# Patient Record
Sex: FEMALE | Race: WHITE | NOT HISPANIC OR LATINO | Employment: UNEMPLOYED | URBAN - METROPOLITAN AREA
[De-identification: names, ages, dates, MRNs, and addresses within clinical notes are randomized per-mention and may not be internally consistent; named-entity substitution may affect disease eponyms.]

---

## 2023-10-23 ENCOUNTER — HOSPITAL ENCOUNTER (OUTPATIENT)
Facility: HOSPITAL | Age: 23
Discharge: HOME | End: 2023-10-23
Attending: PEDIATRICS | Admitting: PEDIATRICS
Payer: MEDICAID

## 2023-10-23 VITALS
TEMPERATURE: 98.3 F | OXYGEN SATURATION: 99 % | SYSTOLIC BLOOD PRESSURE: 115 MMHG | HEART RATE: 72 BPM | WEIGHT: 100 LBS | BODY MASS INDEX: 18.88 KG/M2 | RESPIRATION RATE: 18 BRPM | HEIGHT: 61 IN | DIASTOLIC BLOOD PRESSURE: 52 MMHG

## 2023-10-23 DIAGNOSIS — J02.0 STREP PHARYNGITIS: Primary | ICD-10-CM

## 2023-10-23 LAB — STREP A PCR: DETECTED

## 2023-10-23 PROCEDURE — 87651 STREP A DNA AMP PROBE: CPT | Performed by: PEDIATRICS

## 2023-10-23 PROCEDURE — G0463 HOSPITAL OUTPT CLINIC VISIT: HCPCS

## 2023-10-23 RX ORDER — AMOXICILLIN 500 MG/1
1000 CAPSULE ORAL 2 TIMES DAILY
Qty: 20 CAPSULE | Refills: 0 | Status: SHIPPED | OUTPATIENT
Start: 2023-10-23 | End: 2023-11-02

## 2023-10-23 NOTE — DISCHARGE INSTRUCTIONS
Take Tylenol and Motrin for pain every 4-6 hours    Increase your fluid intake    Take amoxicillin as directed    Follow-up with a family doctor as needed return to ER for worsening symptoms

## 2023-10-23 NOTE — FSED PROVIDER NOTE
Subjective   History of Present Illness  23-year-old female presents here today with her 2 children reporting that on others child tested positive for strep on Saturday.  The patient is reporting a sore throat for 2 to 3 days reports that she is able to drink liquids without any distress.  Denies chest pain shortness of breath nausea vomiting diarrhea diarrhea does report having fever at home but unsure how high the fever was.        Review of Systems   All other systems reviewed and are negative.      History reviewed. No pertinent past medical history.    No Known Allergies    History reviewed. No pertinent surgical history.    History reviewed. No pertinent family history.    Social History     Socioeconomic History    Marital status:            Objective   Physical Exam  Constitutional:       Appearance: She is well-developed and normal weight.   HENT:      Head: Normocephalic and atraumatic.      Right Ear: Tympanic membrane normal.      Left Ear: Tympanic membrane normal.      Mouth/Throat:      Mouth: Mucous membranes are moist.      Pharynx: Uvula midline. Posterior oropharyngeal erythema (Bilateral tonsil redness oropharynx red) present.      Tonsils: Tonsillar exudate (Bilateral white tonsillar exudate) present. 1+ on the right. 1+ on the left.   Eyes:      Conjunctiva/sclera: Conjunctivae normal.   Cardiovascular:      Rate and Rhythm: Normal rate and regular rhythm.   Pulmonary:      Effort: Pulmonary effort is normal.   Abdominal:      General: Bowel sounds are normal.   Musculoskeletal:      Cervical back: Normal range of motion and neck supple.   Skin:     General: Skin is warm and dry.   Neurological:      General: No focal deficit present.      Mental Status: She is alert.         Procedures           ED Course  ED Course as of 10/23/23 1337   Mon Oct 23, 2023   1230 Patient is positive for strep [WF]      ED Course User Index  [WF] Warren Neely Jr., APRN                                            Medical Decision Making  Patient's positive for strep pharyngitis we will treat with amoxicillin no signs of difficulty swallowing or respiratory distress    Problems Addressed:  Strep pharyngitis: complicated acute illness or injury    Risk  Prescription drug management.        Final diagnoses:   Strep pharyngitis       ED Disposition  ED Disposition       ED Disposition   Discharge    Condition   Stable    Comment   --               PATIENT CONNECTION - MARTHA  [facility] [id number]  [phone]  Schedule an appointment as soon as possible for a visit   Or follow-up with your primary care provider         Medication List        New Prescriptions      amoxicillin 500 MG capsule  Commonly known as: AMOXIL  Take 2 capsules by mouth 2 (Two) Times a Day for 10 days.               Where to Get Your Medications        These medications were sent to [facility] [id number] - [place], [place] - [address] [place] [address] [address]  - [phone]  - [phone] [address] [place] [address]      Phone: [phone]   amoxicillin 500 MG capsule

## 2024-01-25 ENCOUNTER — HOSPITAL ENCOUNTER (OUTPATIENT)
Facility: HOSPITAL | Age: 24
Discharge: HOME | End: 2024-01-25
Attending: EMERGENCY MEDICINE | Admitting: EMERGENCY MEDICINE
Payer: MEDICAID

## 2024-01-25 VITALS
OXYGEN SATURATION: 98 % | TEMPERATURE: 98 F | HEART RATE: 79 BPM | SYSTOLIC BLOOD PRESSURE: 106 MMHG | DIASTOLIC BLOOD PRESSURE: 59 MMHG | WEIGHT: 105.9 LBS | BODY MASS INDEX: 19.49 KG/M2 | HEIGHT: 62 IN | RESPIRATION RATE: 18 BRPM

## 2024-01-25 DIAGNOSIS — J02.0 ACUTE STREPTOCOCCAL PHARYNGITIS: Primary | ICD-10-CM

## 2024-01-25 LAB
FLUAV SUBTYP SPEC NAA+PROBE: NOT DETECTED
FLUBV RNA ISLT QL NAA+PROBE: NOT DETECTED
SARS-COV-2 RNA RESP QL NAA+PROBE: NOT DETECTED
STREP A PCR: NOT DETECTED

## 2024-01-25 PROCEDURE — 87651 STREP A DNA AMP PROBE: CPT | Performed by: EMERGENCY MEDICINE

## 2024-01-25 PROCEDURE — 99203 OFFICE O/P NEW LOW 30 MIN: CPT | Performed by: EMERGENCY MEDICINE

## 2024-01-25 PROCEDURE — G0463 HOSPITAL OUTPT CLINIC VISIT: HCPCS | Performed by: EMERGENCY MEDICINE

## 2024-01-25 PROCEDURE — 87636 SARSCOV2 & INF A&B AMP PRB: CPT | Performed by: EMERGENCY MEDICINE

## 2024-01-25 RX ORDER — AMOXICILLIN AND CLAVULANATE POTASSIUM 875; 125 MG/1; MG/1
1 TABLET, FILM COATED ORAL EVERY 12 HOURS
Qty: 14 TABLET | Refills: 0 | Status: SHIPPED | OUTPATIENT
Start: 2024-01-25 | End: 2024-02-01

## 2024-01-25 NOTE — FSED PROVIDER NOTE
Subjective   History of Present Illness  Sore Throat  Patient complains of sore throat. Associated symptoms include post nasal drip and sore throat. Onset of symptoms was 2 week ago, and have been unchanged since that time. She is drinking plenty of fluids. She has had recent close exposure to someone with proven streptococcal pharyngitis.  She has frequent strep infections and states that usually requires Augmentin for resolution her son has similar symptoms at this time          Review of Systems   All other systems reviewed and are negative.      History reviewed. No pertinent past medical history.    No Known Allergies    History reviewed. No pertinent surgical history.    History reviewed. No pertinent family history.    Social History     Socioeconomic History    Marital status:    Tobacco Use    Smoking status: Never    Smokeless tobacco: Never   Vaping Use    Vaping Use: Never used   Substance and Sexual Activity    Alcohol use: Never    Drug use: Never    Sexual activity: Defer           Objective   Physical Exam  Constitutional:       Appearance: Normal appearance.   HENT:      Right Ear: No middle ear effusion.      Mouth/Throat:      Mouth: Mucous membranes are moist.      Pharynx: Oropharyngeal exudate and posterior oropharyngeal erythema present.      Tonsils: Tonsillar exudate present. No tonsillar abscesses.   Eyes:      Conjunctiva/sclera: Conjunctivae normal.   Cardiovascular:      Rate and Rhythm: Normal rate and regular rhythm.   Pulmonary:      Effort: Pulmonary effort is normal.      Breath sounds: Normal breath sounds.   Abdominal:      Palpations: Abdomen is soft.      Tenderness: There is no abdominal tenderness.   Musculoskeletal:         General: No swelling or deformity.   Lymphadenopathy:      Cervical: Cervical adenopathy present.   Skin:     Findings: No rash.   Neurological:      General: No focal deficit present.      Mental Status: She is alert and oriented to person, place,  and time.         Procedures           ED Course                                           Medical Decision Making  Patient has symptoms and findings suggestive of strep throat.  Will treat with Augmentin    Problems Addressed:  Acute streptococcal pharyngitis: complicated acute illness or injury    Risk  Prescription drug management.        Final diagnoses:   Acute streptococcal pharyngitis       ED Disposition  ED Disposition       ED Disposition   Discharge    Condition   Stable    Comment   --               PATIENT CONNECTION - MARTHA  [place] [address]  [phone]  Schedule an appointment as soon as possible for a visit in 2 days      Provider, No Known  [facility] [place] [address]               Medication List        New Prescriptions      amoxicillin-clavulanate 875-125 MG per tablet  Commonly known as: AUGMENTIN  Take 1 tablet by mouth Every 12 (Twelve) Hours for 7 days.               Where to Get Your Medications        These medications were sent to [id number] - [place], [place] - [address] [place] [address] AT [address]  - [phone]  - [phone] [address] [place] [address]      Phone: [phone]   amoxicillin-clavulanate 875-125 MG per tablet

## 2024-01-27 ENCOUNTER — HOSPITAL ENCOUNTER (EMERGENCY)
Facility: HOSPITAL | Age: 24
Discharge: HOME | End: 2024-01-27
Attending: EMERGENCY MEDICINE
Payer: MEDICAID

## 2024-01-27 VITALS
BODY MASS INDEX: 19.83 KG/M2 | HEIGHT: 61 IN | WEIGHT: 105 LBS | TEMPERATURE: 97.7 F | DIASTOLIC BLOOD PRESSURE: 76 MMHG | HEART RATE: 95 BPM | SYSTOLIC BLOOD PRESSURE: 116 MMHG | OXYGEN SATURATION: 100 % | RESPIRATION RATE: 19 BRPM

## 2024-01-27 DIAGNOSIS — J06.9 UPPER RESPIRATORY TRACT INFECTION, UNSPECIFIED TYPE: Primary | ICD-10-CM

## 2024-01-27 LAB — RSV RNA NPH QL NAA+NON-PROBE: NOT DETECTED

## 2024-01-27 PROCEDURE — 87634 RSV DNA/RNA AMP PROBE: CPT

## 2024-01-27 PROCEDURE — 99283 EMERGENCY DEPT VISIT LOW MDM: CPT

## 2024-01-27 NOTE — FSED PROVIDER NOTE
Subjective   History of Present Illness  24-year-old female presents today for cough, sore throat, congestion.  She was seen here few days prior tested negative for COVID, influenza, strep.  She is placed on Augmentin.  She states that she has been taking antibiotics but continues to have a sore throat.  She states she is able to swallow she denies any drooling, she denies any shortness of breath she denies any chest pain.  She denies any fevers, nausea, vomiting.        Review of Systems   Constitutional:  Negative for fever.   HENT:  Positive for congestion and sore throat.    Respiratory:  Positive for cough.    All other systems reviewed and are negative.      No past medical history on file.    No Known Allergies    No past surgical history on file.    No family history on file.    Social History     Socioeconomic History    Marital status:    Tobacco Use    Smoking status: Never    Smokeless tobacco: Never   Vaping Use    Vaping Use: Never used   Substance and Sexual Activity    Alcohol use: Never    Drug use: Never    Sexual activity: Defer           Objective   Physical Exam  Vitals reviewed.   Constitutional:       Appearance: Normal appearance.   HENT:      Head: Normocephalic.      Mouth/Throat:      Mouth: Mucous membranes are moist.      Pharynx: No oropharyngeal exudate or posterior oropharyngeal erythema.   Eyes:      Extraocular Movements: Extraocular movements intact.      Pupils: Pupils are equal, round, and reactive to light.   Cardiovascular:      Rate and Rhythm: Normal rate and regular rhythm.   Pulmonary:      Effort: Pulmonary effort is normal. No respiratory distress.      Breath sounds: Normal breath sounds. No wheezing, rhonchi or rales.   Abdominal:      General: Abdomen is flat.      Palpations: Abdomen is soft.      Tenderness: There is no abdominal tenderness.   Musculoskeletal:         General: Normal range of motion.   Skin:     General: Skin is warm and dry.      Capillary  Refill: Capillary refill takes less than 2 seconds.   Neurological:      General: No focal deficit present.      Mental Status: She is alert and oriented to person, place, and time.         Procedures           ED Course                                           Medical Decision Making  Differential diagnosis includes not limited to viral illness, peritonsillar abscess, URI, pneumonia.  On exam patient appears well, lungs are clear, she is not hypoxic.  She is in no acute distress, she appears nontoxic.  Oropharynx is clear, have very low suspicion for peritonsillar abscess, patient's not had any drooling, she is able to swallow.  I have low suspicion for pneumonia, she is currently on Augmentin, I did offer her chest x-ray patient declined which I think is reasonable given she is already on Augmentin which will treat her for possible pneumonia.  I advised that she continue taking her antibiotics as prescribed, discussed symptomatic treatment.  She is advised to return for drooling, difficulty swallowing, any other concerns.    Problems Addressed:  Upper respiratory tract infection, unspecified type: complicated acute illness or injury    Amount and/or Complexity of Data Reviewed  Labs: ordered.        Final diagnoses:   Upper respiratory tract infection, unspecified type       ED Disposition  ED Disposition       ED Disposition   Discharge    Condition   Stable    Comment   --               [place] [address] [address] [place] [id number] [phone]    If symptoms worsen         Medication List      No changes were made to your prescriptions during this visit.

## 2024-02-02 ENCOUNTER — HOSPITAL ENCOUNTER (EMERGENCY)
Facility: HOSPITAL | Age: 24
Discharge: HOME | End: 2024-02-03
Attending: EMERGENCY MEDICINE
Payer: MEDICAID

## 2024-02-02 VITALS
DIASTOLIC BLOOD PRESSURE: 80 MMHG | OXYGEN SATURATION: 98 % | TEMPERATURE: 98.4 F | HEIGHT: 61 IN | BODY MASS INDEX: 19.83 KG/M2 | WEIGHT: 105 LBS | RESPIRATION RATE: 20 BRPM | SYSTOLIC BLOOD PRESSURE: 123 MMHG | HEART RATE: 78 BPM

## 2024-02-02 DIAGNOSIS — M79.604 PAIN OF RIGHT LOWER EXTREMITY: ICD-10-CM

## 2024-02-02 DIAGNOSIS — N39.0 URINARY TRACT INFECTION WITHOUT HEMATURIA, SITE UNSPECIFIED: Primary | ICD-10-CM

## 2024-02-02 PROCEDURE — 99283 EMERGENCY DEPT VISIT LOW MDM: CPT

## 2024-02-03 LAB
B-HCG UR QL: NEGATIVE
BILIRUB UR QL STRIP: NEGATIVE
C TRACH RRNA CVX QL NAA+PROBE: NOT DETECTED
CLARITY UR: ABNORMAL
COLOR UR: YELLOW
D DIMER PPP FEU-MCNC: 0.2 MCGFEU/ML (ref 0–0.5)
GLUCOSE UR STRIP-MCNC: NEGATIVE MG/DL
HGB UR QL STRIP.AUTO: ABNORMAL
KETONES UR QL STRIP: NEGATIVE
LEUKOCYTE ESTERASE UR QL STRIP.AUTO: ABNORMAL
N GONORRHOEA RRNA SPEC QL NAA+PROBE: NOT DETECTED
NITRITE UR QL STRIP: NEGATIVE
PH UR STRIP.AUTO: 6.5 [PH] (ref 5–8)
PROT UR QL STRIP: NEGATIVE
SP GR UR STRIP: 1.01 (ref 1–1.03)
TRICHOMONAS VAGINALIS PCR: NOT DETECTED
UROBILINOGEN UR QL STRIP: ABNORMAL

## 2024-02-03 PROCEDURE — 87661 TRICHOMONAS VAGINALIS AMPLIF: CPT | Performed by: EMERGENCY MEDICINE

## 2024-02-03 PROCEDURE — 36415 COLL VENOUS BLD VENIPUNCTURE: CPT

## 2024-02-03 PROCEDURE — 87591 N.GONORRHOEAE DNA AMP PROB: CPT | Performed by: EMERGENCY MEDICINE

## 2024-02-03 PROCEDURE — 85379 FIBRIN DEGRADATION QUANT: CPT | Performed by: EMERGENCY MEDICINE

## 2024-02-03 PROCEDURE — 81003 URINALYSIS AUTO W/O SCOPE: CPT | Performed by: EMERGENCY MEDICINE

## 2024-02-03 PROCEDURE — 81025 URINE PREGNANCY TEST: CPT | Performed by: EMERGENCY MEDICINE

## 2024-02-03 PROCEDURE — 99283 EMERGENCY DEPT VISIT LOW MDM: CPT | Performed by: EMERGENCY MEDICINE

## 2024-02-03 PROCEDURE — 87491 CHLMYD TRACH DNA AMP PROBE: CPT | Performed by: EMERGENCY MEDICINE

## 2024-02-03 RX ORDER — CEPHALEXIN 500 MG/1
500 CAPSULE ORAL ONCE
Status: COMPLETED | OUTPATIENT
Start: 2024-02-03 | End: 2024-02-03

## 2024-02-03 RX ORDER — CEPHALEXIN 500 MG/1
500 CAPSULE ORAL 4 TIMES DAILY
Qty: 28 CAPSULE | Refills: 0 | Status: SHIPPED | OUTPATIENT
Start: 2024-02-03 | End: 2024-02-10

## 2024-02-03 RX ADMIN — CEPHALEXIN 500 MG: 500 CAPSULE ORAL at 01:16

## 2024-02-03 NOTE — FSED PROVIDER NOTE
Subjective   History of Present Illness  24 yom complains of painful knot on her leg that just appeared today. No injury, redness, swelling or fever. The patient also reports she had a baby 4 months ago and she is still having some discharge. She notes sometimes it is light brown in color. The patient refuses pelvic exam today and reports she will follow-up with her midwife.       Review of Systems   Constitutional: Negative.  Negative for chills, fatigue and fever.   Gastrointestinal: Negative.  Negative for abdominal pain.   Genitourinary:  Positive for vaginal discharge.   Musculoskeletal:         Leg pain   Skin: Negative.    All other systems reviewed and are negative.      History reviewed. No pertinent past medical history.    No Known Allergies    History reviewed. No pertinent surgical history.    History reviewed. No pertinent family history.    Social History     Socioeconomic History    Marital status:    Tobacco Use    Smoking status: Never    Smokeless tobacco: Never   Vaping Use    Vaping Use: Never used   Substance and Sexual Activity    Alcohol use: Never    Drug use: Never    Sexual activity: Defer           Objective   Physical Exam  Vitals reviewed.   Constitutional:       Appearance: Normal appearance.   HENT:      Head: Normocephalic and atraumatic.      Mouth/Throat:      Mouth: Mucous membranes are moist.      Pharynx: Oropharynx is clear.   Eyes:      Extraocular Movements: Extraocular movements intact.      Pupils: Pupils are equal, round, and reactive to light.   Cardiovascular:      Rate and Rhythm: Normal rate and regular rhythm.      Pulses: Normal pulses.      Heart sounds: Normal heart sounds.   Pulmonary:      Effort: Pulmonary effort is normal.      Breath sounds: Normal breath sounds.   Abdominal:      General: Abdomen is flat.      Tenderness: There is no abdominal tenderness.   Musculoskeletal:         General: Tenderness present. Normal range of motion.        Legs:        Comments: Small area of tenderness to right lower extremity. No erythema, swelling or warmth.  Warm and well perfused  Compartments soft   Skin:     General: Skin is warm and dry.      Capillary Refill: Capillary refill takes less than 2 seconds.   Neurological:      Mental Status: She is alert.         Procedures           ED Course                                           Medical Decision Making  24 yof presents with leg pain. D dimer was performed and was negative. Pt also complains of vaginal discharge after home child birth 4 months ago. Pt refused pelvic exam. UA obtained with trace evidence of infection. Will Rx Keflex and send cx. Strongly encourage patient to follow-up with her Doctor or return for further evaluation.     Problems Addressed:  Pain of right lower extremity: complicated acute illness or injury  Urinary tract infection without hematuria, site unspecified: complicated acute illness or injury    Risk  Prescription drug management.        Final diagnoses:   Urinary tract infection without hematuria, site unspecified   Pain of right lower extremity       ED Disposition  ED Disposition       ED Disposition   Discharge    Condition   Stable    Comment   --               PATIENT CONNECTION - [place] [address]  [phone]             Medication List        New Prescriptions      cephalexin 500 MG capsule  Commonly known as: KEFLEX  Take 1 capsule by mouth 4 (Four) Times a Day for 7 days.            ASK your doctor about these medications      amoxicillin-clavulanate 875-125 MG per tablet  Commonly known as: AUGMENTIN  Take 1 tablet by mouth Every 12 (Twelve) Hours for 7 days.  Ask about: Should I take this medication?               Where to Get Your Medications        These medications were sent to [facility] [id number] - [place], [place] - [address] [place] [address] [address]  - [phone]  - [phone] [address] [place] [address]      Phone: [phone]    cephalexin 500 MG capsule

## 2024-02-03 NOTE — DISCHARGE INSTRUCTIONS
Take medication as prescribed. Follow-up with your Doctor for further evaluation this week. Return if problems.